# Patient Record
Sex: FEMALE | URBAN - METROPOLITAN AREA
[De-identification: names, ages, dates, MRNs, and addresses within clinical notes are randomized per-mention and may not be internally consistent; named-entity substitution may affect disease eponyms.]

---

## 2024-06-25 ENCOUNTER — OFFICE VISIT (OUTPATIENT)
Age: 69
End: 2024-06-25
Payer: MEDICARE

## 2024-06-25 VITALS — HEIGHT: 71 IN | HEART RATE: 69 BPM | TEMPERATURE: 97.1 F | OXYGEN SATURATION: 99 %

## 2024-06-25 DIAGNOSIS — M25.552 LEFT HIP PAIN: ICD-10-CM

## 2024-06-25 DIAGNOSIS — M54.50 LUMBAR PAIN: ICD-10-CM

## 2024-06-25 DIAGNOSIS — M54.16 LUMBAR RADICULOPATHY: ICD-10-CM

## 2024-06-25 DIAGNOSIS — M25.552 BILATERAL HIP PAIN: ICD-10-CM

## 2024-06-25 DIAGNOSIS — M25.551 BILATERAL HIP PAIN: ICD-10-CM

## 2024-06-25 DIAGNOSIS — G89.29 CHRONIC PRIMARY MUSCULOSKELETAL PAIN: Primary | ICD-10-CM

## 2024-06-25 DIAGNOSIS — M79.18 MYOFASCIAL PAIN: ICD-10-CM

## 2024-06-25 DIAGNOSIS — M79.18 CHRONIC PRIMARY MUSCULOSKELETAL PAIN: Primary | ICD-10-CM

## 2024-06-25 PROCEDURE — 99204 OFFICE O/P NEW MOD 45 MIN: CPT | Performed by: PHYSICAL MEDICINE & REHABILITATION

## 2024-06-25 PROCEDURE — 1123F ACP DISCUSS/DSCN MKR DOCD: CPT | Performed by: PHYSICAL MEDICINE & REHABILITATION

## 2024-06-25 RX ORDER — GABAPENTIN 300 MG/1
300 CAPSULE ORAL 3 TIMES DAILY
COMMUNITY
Start: 2024-04-23

## 2024-06-25 RX ORDER — GABAPENTIN 100 MG/1
200 CAPSULE ORAL NIGHTLY
COMMUNITY
Start: 2024-06-15

## 2024-06-25 RX ORDER — MELOXICAM 7.5 MG/1
7.5 TABLET ORAL 2 TIMES DAILY
COMMUNITY
Start: 2024-06-05

## 2024-06-25 RX ORDER — ERGOCALCIFEROL 1.25 MG/1
50000 CAPSULE ORAL WEEKLY
COMMUNITY
Start: 2024-06-03

## 2024-06-25 ASSESSMENT — ENCOUNTER SYMPTOMS
WHEEZING: 0
BACK PAIN: 1
NAUSEA: 0
SHORTNESS OF BREATH: 0
TROUBLE SWALLOWING: 0
VOMITING: 0

## 2024-06-25 NOTE — PROGRESS NOTES
Broderick Will presents today for   Chief Complaint   Patient presents with    Lower Back Pain       Is someone accompanying this pt? no    Is the patient using any DME equipment during OV? no    Coordination of Care:  1. Have you been to the ER, urgent care clinic since your last visit? no  Hospitalized since your last visit? no    2. Have you seen or consulted any other health care providers outside of the [facility] since your last visit? Yes, ortho, pcp Include any pap smears or colon screening. no        
in her teens       FAMILY HISTORY    No family history on file.    VITALS   Pulse 69   Temp 97.1 °F (36.2 °C) (Oral)   Ht 1.791 m (5' 10.5\")   SpO2 99% Comment: ALEJANDRA    PAIN ASSESSMENT         6/25/2024     8:51 AM   AMB PAIN ASSESSMENT   Location of Pain Back   Severity of Pain 5   Quality of Pain Throbbing;Other (Comment)   Duration of Pain Persistent   Frequency of Pain Constant   Aggravating Factors Other (Comment)   Limiting Behavior Yes   Relieving Factors Other (Comment);Heat;Nsaids   Result of Injury No   Work-Related Injury No   Are there other pain locations you wish to document? No         RADIOGRAPHS/DATA  Lumbar MRI images taken on 2/28/24 were personally reviewed with patient:  Findings:  Conus medullaris tip: L1-2  Alignment: Normal.  Marrow: Normal  Osseous: No compression fractures.  Degenerative disc disease: Disc space narrowing mid lumbar spine  Miscellaneous findings: None    Level by level analysis:  T12-L1, Normal disc. No stenosis.   L1-2, mild disc bulge. No stenosis.  L2-3, mild disc bulge. Mild ligamentum flavum and facet hypertrophy. Mild bilateral neuro foraminal stenosis. Mild central spinal canal stenosis.  L3-4, moderate disc bulge. Mild ligamentum flavum and facet hypertrophy. Dorsal epidural lipomatosis. Moderate bilateral neuroforaminal stenosis. Right lateral recess stenosis. Mild to moderate central spinal canal stenosis.  L4-5, moderate disc bulge. Mild ligamentum flavum and facet hypertrophy. Dorsal epidural lipomatosis. Moderate right and mild left neuroforaminal stenosis. Right lateral recess stenosis. Mild to moderate central spinal canal stenosis.  L5-S1, moderate disc bulge. Moderate bilateral neuroforaminal stenosis. Lateral recess stenosis. Bilateral facet hypertrophy. No overall high-grade central spinal canal stenosis.    Impression:  L3-4 mild disc bulge. Moderate bilateral neuroforaminal stenosis. Mild central canal stenosis.   L4-5 moderate disc bulge. Moderate

## 2024-07-15 ENCOUNTER — HOSPITAL ENCOUNTER (OUTPATIENT)
Facility: HOSPITAL | Age: 69
Setting detail: RECURRING SERIES
Discharge: HOME | End: 2024-07-18
Payer: MEDICARE

## 2024-07-15 PROCEDURE — 97162 PT EVAL MOD COMPLEX 30 MIN: CPT

## 2024-07-15 NOTE — PROGRESS NOTES
PT DAILY TREATMENT NOTE/LUMBAR EVAL     Patient Name: Broderick Will    Date: 7/15/2024    : 1955  Insurance: Payor: [employer] / Plan: Short [employer] MEDICARE VALUE HMO / Product Type: *No Product type* /      Patient  verified Yes   Visit #   Current / Total 1 10   Time   In / Out 10:27 AM 11:08 AM   Pain   In / Out 3/10 3/10   Subjective Functional Status/Changes: \"I am here for my back and my left hip.\"      SUBJECTIVE  Chief Complaint: Patient presents to PT with complaints of low back pain and hip pain. Reports symptoms in bilateral LE's as well. Notes everything started as low back pain a few years ago with worsening insidiously last year in March and then progressed to also feeling left hip pain about a month ago.  Imaging:   Lumbar MRI from [facility]. Per report, L3-4 mild disc bulge. Moderate bilateral neuroforaminal stenosis. Mild central canal stenosis. L4-5 moderate disc bulge. Moderate right neuroforaminal stenosis Right lateral recess stenosis. Mild to moderate central canal stenosis. L5-S1 moderate disc bulge. Moderate bilateral neuroforaminal stenosis. Lateral recess stenosis. This may effect the descending S1 nerve roots. Overall, no high-grade central spinal canal stenosis.   X-rays: has received for hips but is unsure of results   Mechanism of Injury: insidious onset with gradual increase in intensity   Current Functional Deficits: sleeping at night (getting comfortable), transferring, standing, prolonged sitting, walking for longer periods, stair climbing, squatting, bending over   Previous Treatment/Compliance: tylenol, gabapentin   PMHx/Surgical Hx: right shoulder pain, osteoporosis, arthritis   Pt Goals: \"how to better understand body movements with stretches\"     Pain Location: Low back; left hip (posterior)    Pain Status: [x]Constant (varies in intensity) [x]worsening   Pain Description: [x]stabbing [x]dull [x]aching [x]throbbing [x]Burning [x]Other:

## 2024-07-15 NOTE — PROGRESS NOTES
[facility] PH[facility]ERAP[facility]  [address] [address] Phone: [phone] Fax   Plan of Care / Statement of Necessity for Physical Therapy Services     Patient Name: Broderick Will : 1955   Medical   Diagnosis: Other low back pain [M54.59]  Left hip pain [M25.552] Treatment Diagnosis: M25.552  LEFT HIP PAIN  and M54.59, G89.29  CHRONIC LOWER BACK PAIN      Onset Date: 2024 (worsening symptoms) Payor :  Payor: [employer] / Plan: [employer] VALUE [employer] / Product Type: *No Product type* /    Referral Source: Phill Lamar MD Start of Care (SOC): 7/15/2024   Prior Hospitalization: See medical history Provider #: [id number]   Prior Level of Function: Ambulatory, mostly sedentary, independent with Adls    Comorbidities: Osteoporosis, arthritis, right shoulder pain      Assessment / key information:  Patient is a pleasant 68 year old female presenting to PT with complaints of low back pain and left hip pain. Patient notes low back pain started years ago but worsened insidiously about a year ago and a month ago insidiously developed left hip pain. Self reported functional deficits include walking, standing, prolonged static standing, bending, lifting, squatting, doing yard work, and transferring. PT evaluation reveals decreased trunk AROM, tenderness of lumbar/left hip musculature, strength impairments, functional movement abnormalities, balance limitations, and flexibility deficits. Patient would benefit from skilled PT to address noted impairments in order to return to PLOF, maintain independence, and reduce risk of further debility.     Evaluation Complexity:  History:  MEDIUM  Complexity : 1-2 comorbidities / personal factors will impact the outcome/ POC ; Examination:  HIGH Complexity : 4+ Standardized tests and measures addressing body structure, function, activity limitation and / or participation in recreation  ;Presentation:

## 2024-08-05 ENCOUNTER — HOSPITAL ENCOUNTER (OUTPATIENT)
Facility: HOSPITAL | Age: 69
Setting detail: RECURRING SERIES
Discharge: HOME | End: 2024-08-08
Payer: MEDICARE

## 2024-08-05 PROCEDURE — 97112 NEUROMUSCULAR REEDUCATION: CPT

## 2024-08-05 PROCEDURE — 97110 THERAPEUTIC EXERCISES: CPT

## 2024-08-05 PROCEDURE — 97530 THERAPEUTIC ACTIVITIES: CPT

## 2024-08-05 NOTE — PROGRESS NOTES
at eval: excessive UE assist, poor mechanics   -Patient to improve walking tolerance to at least 20 minutes to begin return to daily walking program and manage community outings including to grocery stores.   Status at eval: <20 minutes, increased pain   -Patient will be able to manage all activities of daily living including dressing, bathing, and managing chores with no more than 3/10 pain at worse in order to optimize functional capacity.  Status at eval: limited and increased pain     Next PN/ RC due 8/15/2024  Auth due 24 visits (7/15/2024-9/06/2024)     PLAN  Yes Continue plan of care  [x]  Upgrade activities as tolerated  []  Discharge due to :  []  Other:    Rebecca Hernandez, PT    8/5/2024    9:59 AM    Future Appointments   Date Time Provider Department Center   8/5/2024 10:20 AM Rebecca Hernandez, PT [facility]   8/7/2024 10:20 AM Kit Hancock PTA [facility]   8/12/2024  9:40 AM [facility] PT TERI Anderson [facility]   8/13/2024  9:00 AM Kit Hancock PTA [facility]   8/19/2024  9:40 AM Rebecca Hernandez, PT [facility]   8/20/2024  9:30 AM Phill Lamar MD [facility]   8/20/2024 11:40 AM Tom Carranza, PT [facility]

## 2024-08-07 ENCOUNTER — HOSPITAL ENCOUNTER (OUTPATIENT)
Facility: HOSPITAL | Age: 69
Setting detail: RECURRING SERIES
Discharge: HOME | End: 2024-08-10
Payer: MEDICARE

## 2024-08-07 PROCEDURE — 97530 THERAPEUTIC ACTIVITIES: CPT

## 2024-08-07 PROCEDURE — G0283 ELEC STIM OTHER THAN WOUND: HCPCS

## 2024-08-07 PROCEDURE — 97110 THERAPEUTIC EXERCISES: CPT

## 2024-08-07 PROCEDURE — 97112 NEUROMUSCULAR REEDUCATION: CPT

## 2024-08-07 NOTE — PROGRESS NOTES
PHYSICAL / OCCUPATIONAL THERAPY - DAILY TREATMENT NOTE    Patient Name: Broderick Will    Date: 2024    : 1955  Insurance: Payor: [employer] / [facility] / Product Type: *No Product type* /      Patient  verified Yes     Visit #   Current / Total 3 10   Time   In / Out 10:22 am 11:12 am   Pain   In / Out 4/10 2/10   Subjective Functional Status/Changes: \"It's the same all the time. I don't want surgery though.\"     TREATMENT AREA =  Other low back pain [M54.59]  Left hip pain [M25.552]     OBJECTIVE  Modalities Rationale:     decrease pain to improve patient's ability to progress to PLOF and address remaining functional goals.    10 min [x] Estim Unattended, type/location: IFC to L/S with patient semi-reclined with legs elevated on wedge                                     []  w/ice    [x]  w/heat    min [] Estim Attended, type/location:                                     []  w/US     []  w/ice    []  w/heat    []  TENS insruct      min []  Mechanical Traction: type/lbs                   []  pro   []  sup   []  int   []  cont    []  before manual    []  after manual    min []  Ultrasound, settings/location:      min  unbill []  Ice     []  Heat    location/position:     min []  Paraffin,  details:     min []  Vasopneumatic Device, press/temp:     min []  Whirlpool / Fluido:    If using vaso (only need to measure limb vaso being performed on)      pre-treatment girth :       post-treatment girth :       measured at (landmark location) :      min []  Other:    Skin assessment post-treatment:   Intact        Therapeutic Procedures:  Tx Min Billable or 1:1 Min (if diff from Tx Min) Procedure, Rationale, Specifics   18 16 [id number] Therapeutic Exercise (timed):  increase ROM, strength, coordination, balance, and proprioception to improve patient's ability to progress to PLOF and address remaining functional goals. (see flow sheet as applicable)     Details if applicable:       10 10

## 2024-08-12 ENCOUNTER — HOSPITAL ENCOUNTER (OUTPATIENT)
Facility: HOSPITAL | Age: 69
Setting detail: RECURRING SERIES
Discharge: HOME | End: 2024-08-15
Payer: MEDICARE

## 2024-08-12 PROCEDURE — 97110 THERAPEUTIC EXERCISES: CPT

## 2024-08-12 PROCEDURE — 97530 THERAPEUTIC ACTIVITIES: CPT

## 2024-08-12 PROCEDURE — 97112 NEUROMUSCULAR REEDUCATION: CPT

## 2024-08-12 NOTE — PROGRESS NOTES
PHYSICAL / OCCUPATIONAL THERAPY - DAILY TREATMENT NOTE    Patient Name: Broderick Will    Date: 2024    : 1955  [facility] / Plan: [facility] MEDICARE VALUE HMO / Product Type: *No Product type* /      Patient  verified Yes     Visit #   Current / Total 4 10   Time   In / Out 942 1032   Pain   In / Out 5 5   Subjective Functional Status/Changes: \"It's the same all the time. I don't want surgery though.\"     TREATMENT AREA =  Other low back pain [M54.59]  Left hip pain [M25.552]     OBJECTIVE  Modalities Rationale:     decrease pain to improve patient's ability to progress to PLOF and address remaining functional goals.    10 min [x] Estim Unattended, type/location: PREMOD  to L/S with patient semi-reclined with legs elevated on wedge                                     []  w/ice    []  w/heat    min [] Estim Attended, type/location:                                     []  w/US     []  w/ice    []  w/heat    []  TENS insruct      min []  Mechanical Traction: type/lbs                   []  pro   []  sup   []  int   []  cont    []  before manual    []  after manual    min []  Ultrasound, settings/location:      min  unbill []  Ice     []  Heat    location/position:     min []  Paraffin,  details:     min []  Vasopneumatic Device, press/temp:     min []  Whirlpool / Fluido:    If using vaso (only need to measure limb vaso being performed on)      pre-treatment girth :       post-treatment girth :       measured at (landmark location) :      min []  Other:    Skin assessment post-treatment:   Intact        Therapeutic Procedures:  Tx Min Billable or 1:1 Min (if diff from Tx Min) Procedure, Rationale, Specifics   15  [id number] Therapeutic Exercise (timed):  increase ROM, strength, coordination, balance, and proprioception to improve patient's ability to progress to PLOF and address remaining functional goals. (see flow sheet as applicable)     Details if applicable:    Added 3 way hip   12

## 2024-08-13 ENCOUNTER — HOSPITAL ENCOUNTER (OUTPATIENT)
Facility: HOSPITAL | Age: 69
Setting detail: RECURRING SERIES
Discharge: HOME | End: 2024-08-16
Payer: MEDICARE

## 2024-08-13 PROCEDURE — G0283 ELEC STIM OTHER THAN WOUND: HCPCS

## 2024-08-13 PROCEDURE — 97530 THERAPEUTIC ACTIVITIES: CPT

## 2024-08-13 PROCEDURE — 97112 NEUROMUSCULAR REEDUCATION: CPT

## 2024-08-13 PROCEDURE — 97110 THERAPEUTIC EXERCISES: CPT

## 2024-08-13 NOTE — PROGRESS NOTES
with left sided pain     SB left 20-30 FT to one inch inferior to PFJ        Neuro Screen [x] Light touch intact      Dural Mobility:  SLR Sitting: [x] -      Palpation:TTP left gluteals      Strength: measured in seated position     L(0-5) R (0-5)   Hip Flexion (L1,2) 4+ 4+   Knee Extension (L3,4) 5 5   Ankle Dorsiflexion (L4) 5 5   Hip IR 4 4   Hip ER 4+ 4+   Knee Flexion (S1,2) 4+ 4+      Functional Strength:  Bridge in Hooklyin% AROM  PPT/TrA: improved sequencing     Flexibility Deficits: hamstring, quadratus lumborum, hip external rotators                                         Global Muscular Weakness:  Abdominals: +  Quadratus Lumborum: +  Paraspinals: +     Balance:  SLS: (R) 29 sec, (L) 15 sec     Functional Movement Patterns  5 Times Sit to Stand: 7 seconds, no UE assist from standard chair       Progress toward goals / Updated goals:    Short Term Goals: To be accomplished in  4 weeks:  -Pt will be independent and compliant with HEP  Status at eval: HEP est at initial eval and will be progressed as needed.   Current: goal met  -Patient will be able to perform sit to stand without UE assist and proper eccentric control to improve ability to get up from toilet and chairs in home/community.  Status at eval: excessive UE assist, poor mechanics   Current: goal met from standard chair height     -Patient to improve walking tolerance to at least 20 minutes to begin return to daily walking program and manage community outings including to grocery stores.   Status at eval: <20 minutes, increased pain   Current: goal met; up to 4-5 hours until increase in left LE pain     -Patient will be able to manage all activities of daily living including dressing, bathing, and managing chores with no more than 3/10 pain at worse in order to optimize functional capacity.  Status at eval: limited and increased pain   Current: goal met; pt managing 1-2/10 on average via activity modification, HEP, and decreased medication 
mechanics   Current: goal met from standard chair height    -Patient to improve walking tolerance to at least 20 minutes to begin return to daily walking program and manage community outings including to grocery stores.   Status at eval: <20 minutes, increased pain   Current: goal met; up to 4-5 hours until increase in left LE pain    -Patient will be able to manage all activities of daily living including dressing, bathing, and managing chores with no more than 3/10 pain at worse in order to optimize functional capacity.  Status at eval: limited and increased pain   Current: goal met; pt managing 1-2/10 on average via activity modification, HEP, and decreased medication use         Long Term Goals: To be accomplished in  8-12  weeks from initial evaluation    -Patient will increase Oswestry score to 29% for indications of improved self perception of condition and to indicate clinically significant change in symptoms.   Status at IE: 42%   -Patient to improve standing tolerance to at least 30 minutes to manage waiting in lines in grocery stores, washing dishes, and managing work tasks with no more than 3/10 pain.  Status at IE: limited tolerance and increased pain   -Patient will improve 5 times sit to stand test to no more than 12 seconds, without UE assist, to indicate improved functional capacity with transfers and decreased fall risk.  Status at IE 20 seconds   -Patient will be IND with DC HEP for progression to self management    Status at IE: progressive HEP throughout treatment       Next PN/RC due 8/15/24  Authorization due (visit number/date) 24 visits / 9-6-24    PLAN  - Continue Plan of Care  - Other : see reassessment ; cont 1-2x4      If an interpreting service was utilized for treatment of this patient, the contents of this document represent the material reviewed with the patient via the .    Kit Hancock, PTA    8/13/2024    7:16 AM    Future Appointments   Date Time Provider

## 2024-08-15 NOTE — PROGRESS NOTES
[facility]  [facility], [address]  [place] [address]  Phone: [phone]  Fax: [phone]      Broderick Will  : 1955  PCP: Chele Wu MD  2024    PROGRESS NOTE    HISTORY OF PRESENT ILLNESS    24  c/o back pain radiating into buttock (L>R) ongoing a few years exacerbated more recently.   Pt initially had problems in feet feeling funny. Pt denies weakness in feet.   Pt previously had EMG with normal readings.   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   Pt notes previous hx of bursitis in right hip.   Pt takes Mobic 7.5mg BID PRN. Pt notes occasionally waking up with pain.   Pt takes Gabapentin with dizziness and somnolence.  Pt presents with Lumbar MRI from [facility]. Per report, L3-4 mild disc bulge. Moderate bilateral neuroforaminal stenosis. Mild central canal stenosis. L4-5 moderate disc bulge. Moderate right neuroforaminal stenosis Right lateral recess stenosis. Mild to moderate central canal stenosis. L5-S1 moderate disc bulge. Moderate bilateral neuroforaminal stenosis. Lateral recess stenosis. This may effect the descending S1 nerve roots. Overall, no high-grade central spinal canal stenosis.  PLAN: 3-4V BL Hip/Pelvis XR; Referral to PT      Broderick Will is a 69 y.o. female was seen today for follow up. Pt attended PT (7/15/24-ongoing) with benefit.     B/L Hips 3V XR dated 24 was reviewed. Per report, Mild bilateral hip osteoarthritis.     Pain Score:   2/10     reviewed.    REVIEW OF SYSTEMS  Review of Systems   Constitutional:  Negative for fever and unexpected weight change.   HENT:  Negative for trouble swallowing.    Eyes:  Negative for visual disturbance.   Respiratory:  Negative for shortness of breath and wheezing.    Gastrointestinal:  Negative for nausea and vomiting.   Genitourinary:  Negative for enuresis.   Musculoskeletal:  Positive for

## 2024-08-19 ENCOUNTER — HOSPITAL ENCOUNTER (OUTPATIENT)
Facility: HOSPITAL | Age: 69
Setting detail: RECURRING SERIES
Discharge: HOME | End: 2024-08-22
Payer: MEDICARE

## 2024-08-19 PROCEDURE — 97112 NEUROMUSCULAR REEDUCATION: CPT

## 2024-08-19 PROCEDURE — G0283 ELEC STIM OTHER THAN WOUND: HCPCS

## 2024-08-19 PROCEDURE — 97530 THERAPEUTIC ACTIVITIES: CPT

## 2024-08-19 PROCEDURE — 97110 THERAPEUTIC EXERCISES: CPT

## 2024-08-19 NOTE — PROGRESS NOTES
PHYSICAL / OCCUPATIONAL THERAPY - DAILY TREATMENT NOTE    Patient Name: Broderick Will    Date: 2024    : 1955  Insurance: Payor: [employer] / Plan: [employer] MEDICARE VALUE HMO / Product Type: *No Product type* /      Patient  verified Yes     Visit #   Current / Total 1 10   Time   In / Out 9:40 AM 10:36 AM   Pain   In / Out 1/10  0/10   Subjective Functional Status/Changes: \"I am doing pretty good today.\"      TREATMENT AREA =  Other low back pain [M54.59]  Left hip pain [M25.552]     OBJECTIVE  Modalities Rationale:     decrease pain to improve patient's ability to progress to PLOF and address remaining functional goals.    10 min [x] Estim Unattended, type/location: IFC  to L/S in supported hooklying                                     []  w/ice    []  w/heat   Skin assessment post-treatment:   Intact        Therapeutic Procedures:  Tx Min  46 Billable or 1:1 Min (if diff from Tx Min)  41 Procedure, Rationale, Specifics   17 12 73400 Therapeutic Exercise (timed):  increase ROM, strength, coordination, balance, and proprioception to improve patient's ability to progress to PLOF and address remaining functional goals. (see flow sheet as applicable)     Details if applicable: stretches, strengthening, nu step      13 13 32645 Neuromuscular Re-Education (timed):  improve balance, coordination, kinesthetic sense, posture, core stability and proprioception to improve patient's ability to develop conscious control of individual muscles and awareness of position of extremities in order to progress to PLOF and address remaining functional goals. (see flow sheet as applicable)     Details if applicable: glute/core re ed, balance     16 16 05624 Therapeutic Activity (timed):  use of dynamic activities replicating functional movements to increase ROM, strength, coordination, balance, and proprioception in order to improve patient's ability to progress to PLOF and address remaining functional goals.  (see

## 2024-08-20 ENCOUNTER — HOSPITAL ENCOUNTER (OUTPATIENT)
Facility: HOSPITAL | Age: 69
Setting detail: RECURRING SERIES
Discharge: HOME | End: 2024-08-23
Payer: MEDICARE

## 2024-08-20 ENCOUNTER — OFFICE VISIT (OUTPATIENT)
Age: 69
End: 2024-08-20
Payer: MEDICARE

## 2024-08-20 VITALS — BODY MASS INDEX: 28.76 KG/M2 | WEIGHT: 205.4 LBS | HEIGHT: 71 IN

## 2024-08-20 DIAGNOSIS — M79.18 CHRONIC PRIMARY MUSCULOSKELETAL PAIN: Primary | ICD-10-CM

## 2024-08-20 DIAGNOSIS — M79.18 MYOFASCIAL PAIN: ICD-10-CM

## 2024-08-20 DIAGNOSIS — M54.50 LUMBAR PAIN: ICD-10-CM

## 2024-08-20 DIAGNOSIS — G89.29 CHRONIC PRIMARY MUSCULOSKELETAL PAIN: Primary | ICD-10-CM

## 2024-08-20 DIAGNOSIS — M25.552 BILATERAL HIP PAIN: ICD-10-CM

## 2024-08-20 DIAGNOSIS — M25.551 BILATERAL HIP PAIN: ICD-10-CM

## 2024-08-20 DIAGNOSIS — M54.16 LUMBAR RADICULOPATHY: ICD-10-CM

## 2024-08-20 PROBLEM — E55.9 VITAMIN D DEFICIENCY: Status: ACTIVE | Noted: 2017-11-28

## 2024-08-20 PROBLEM — M25.562 ACUTE PAIN OF LEFT KNEE: Status: ACTIVE | Noted: 2017-11-28

## 2024-08-20 PROCEDURE — 97110 THERAPEUTIC EXERCISES: CPT

## 2024-08-20 PROCEDURE — 97112 NEUROMUSCULAR REEDUCATION: CPT

## 2024-08-20 PROCEDURE — G0283 ELEC STIM OTHER THAN WOUND: HCPCS

## 2024-08-20 PROCEDURE — 99213 OFFICE O/P EST LOW 20 MIN: CPT | Performed by: PHYSICAL MEDICINE & REHABILITATION

## 2024-08-20 PROCEDURE — 1123F ACP DISCUSS/DSCN MKR DOCD: CPT | Performed by: PHYSICAL MEDICINE & REHABILITATION

## 2024-08-20 RX ORDER — MELOXICAM 15 MG/1
TABLET ORAL
COMMUNITY
Start: 2024-07-18

## 2024-08-20 ASSESSMENT — ENCOUNTER SYMPTOMS
BACK PAIN: 1
TROUBLE SWALLOWING: 0
WHEEZING: 0
SHORTNESS OF BREATH: 0
VOMITING: 0
NAUSEA: 0

## 2024-08-20 NOTE — PROGRESS NOTES
PHYSICAL / OCCUPATIONAL THERAPY - DAILY TREATMENT NOTE (updated )    Patient Name: Broderick Will    Date: 2024    : 1955  Insurance: Payor: [employer] / Plan: [employer] MEDICARE VALUE HMO / Product Type: *No Product type* /      Patient  verified yes     Visit #   Current / Total 2 10 Total Time   Time   In / Out 11:40 12:35 55   Pain   In / Out 1 0    Subjective Functional Status/Changes: I just saw Dr. Lamar, he found arthritis in my hips on the x ray. I feel better knowing what is going on and causing my pain.       TREATMENT AREA =  Other low back pain [M54.59]  Left hip pain [M25.552]    OBJECTIVE    Modalities Rationale:     decrease pain to improve patient's ability to progress to PLOF and address remaining functional goals.             --  10 min [x] Estim Unattended,   Type:  Location:  Position:   IFC  Supine w/ wedge  Lumbar spine                                     []  w/ice    [x]  w/heat   Skin assessment post-treatment (if applicable):    [x]  intact    []  redness- no adverse reaction                 []redness - adverse reaction:           Therapeutic Procedures:  45  Total 41  Total [facility] Totals Reminder: bill using total billable min of TIMED therapeutic procedures (example: do not include dry needle or estim unattended, both untimed codes, in totals to left)  8-22 min = 1 unit; 23-37 min = 2 units; 38-52 min = 3 units; 53-67 min = 4 units; 68-82 min = 5 units   Tx Min Billable or 1:1 Min (if diff from Tx Min) Procedure, Rationale, Specifics   31 27 67310 Therapeutic Exercise (timed):  increase ROM, strength, coordination, balance, and proprioception to improve patient's ability to progress to PLOF and address remaining functional goals. (see flow sheet as applicable)   Details if applicable:       14 14 28798 Neuromuscular Re-Education (timed):  improve balance, coordination, kinesthetic sense, posture, core stability and proprioception to improve patient's ability to

## 2024-08-26 ENCOUNTER — HOSPITAL ENCOUNTER (OUTPATIENT)
Facility: HOSPITAL | Age: 69
Setting detail: RECURRING SERIES
Discharge: HOME | End: 2024-08-29
Payer: MEDICARE

## 2024-08-26 PROCEDURE — 97110 THERAPEUTIC EXERCISES: CPT

## 2024-08-26 PROCEDURE — G0283 ELEC STIM OTHER THAN WOUND: HCPCS

## 2024-08-26 PROCEDURE — 97112 NEUROMUSCULAR REEDUCATION: CPT

## 2024-08-26 NOTE — PROGRESS NOTES
of extremities in order to progress to PLOF and address remaining functional goals. (see flow sheet as applicable)     Details if applicable:  Core re ed             [x]  Patient Education billed concurrently with other procedures   [x] Review HEP    [] Progressed/Changed HEP, detail:    [x] Other detail:   managing arthritis with regular movement and \"wellness\"    Objective Information/Functional Measures/Assessment    Focus treatment on freq reminders for TA engagement   Challenged by GTB with 3 way hip - sharon on the L   Bridge - 50% sec to lack of core/ hip strength and spinal mobility   5x STS test : 24 sec   MM fatigue   Cont ES for pain management to increase function      Patient will continue to benefit from skilled PT / OT services to modify and progress therapeutic interventions, analyze and address functional mobility deficits, analyze and address ROM deficits, analyze and address strength deficits, analyze and address soft tissue restrictions, analyze and cue for proper movement patterns, analyze and modify for postural abnormalities, analyze and address imbalance/dizziness, and instruct in home and community integration to address functional deficits and attain remaining goals.    Progress toward goals / Updated goals:  []  See Progress Note/Recertification    -Patient will increase Oswestry score to 29% for indications of improved self perception of condition and to indicate clinically significant change in symptoms.   Status at IE: 42%   Current:     -Patient to improve standing tolerance to at least 30 minutes to manage waiting in lines in grocery stores, washing dishes, and managing work tasks with no more than 3/10 pain.  Status at IE: limited tolerance and increased pain   Current: goal progressing, depends on activity before attempting but suspects less than 30 minutes (8/19/2024)     -Patient will improve 5 times sit to stand test to no more than 12 seconds, without UE assist, to indicate  improved functional capacity with transfers and decreased fall risk.  Status at IE 20 seconds   Current: not met at 24 sec however performed at the end of treatment/ fatigued  8/26/2024    -Patient will be IND with DC HEP for progression to self management    Status at IE: progressive HEP throughout treatment   Current: updated today (8/20/24)     Next PN/RC due 9/12/2024  Authorization due (visit number/date) 24 visits / 9-6-24    PLAN  [x] Continue plan of care  [x]  Upgrade activities as tolerated  []  Discharge due to :  []  Other:    Henry Krishnamurthy, PT    8/26/2024        If an interpreting service was utilized for treatment of this patient, the contents of this document represent the material reviewed with the patient via the .     Future Appointments   Date Time Provider Department Center   8/26/2024  7:00 AM Henry Krishnamurthy PT [facility]   8/27/2024  7:40 AM [facility] PT TERI 3 [facility]   9/3/2024  9:00 AM Kit Hancock PTA [facility]   10/15/2024  9:30 AM Phill Lamar MD [facility]

## 2024-08-27 ENCOUNTER — HOSPITAL ENCOUNTER (OUTPATIENT)
Facility: HOSPITAL | Age: 69
Setting detail: RECURRING SERIES
Discharge: HOME | End: 2024-08-30
Payer: MEDICARE

## 2024-08-27 PROCEDURE — 97140 MANUAL THERAPY 1/> REGIONS: CPT

## 2024-08-27 PROCEDURE — 97112 NEUROMUSCULAR REEDUCATION: CPT

## 2024-08-27 PROCEDURE — G0283 ELEC STIM OTHER THAN WOUND: HCPCS

## 2024-08-27 PROCEDURE — 97535 SELF CARE MNGMENT TRAINING: CPT

## 2024-08-27 NOTE — PROGRESS NOTES
PHYSICAL / OCCUPATIONAL THERAPY - DAILY TREATMENT NOTE (updated )    Patient Name: Broderick Will    Date: 2024    : 1955  Insurance: Payor: [employer] / [facility] / Product Type: *No Product type* /      Patient  verified Yes     Visit #   Current / Total 4 10   Time   In / Out 7:46 8:40   Pain   In / Out 2-3 0   Subjective Functional Status/Changes: Pt reports left sided LBP  today.      TREATMENT AREA =  Other low back pain [M54.59]  Left hip pain [M25.552]    OBJECTIVE    Modalities Rationale:     decrease pain and increase tissue extensibility to improve patient's ability to progress to PLOF and address remaining functional goals.    10 min [x] Estim Unattended, type/location: Pre mod to left glutes and l/s; in supine with legs on wedge.                                      []  w/ice    [x]  w/heat   Skin assessment post-treatment (if applicable):    [x]  intact    []  redness- no adverse reaction                 []redness - adverse reaction:         Therapeutic Procedures:  Tx Min Billable or 1:1 Min (if diff from Tx Min) Procedure, Rationale, Specifics   12  21629 Self Care/Home Management (timed):  improve patient knowledge and understanding of pain reducing techniques, positioning, posture/ergonomics, home safety, activity modification, diagnosis/prognosis, and physical therapy expectations, procedures and progression  to improve patient's ability to progress to PLOF and address remaining functional goals.  (see flow sheet as applicable)     Details if applicable:  Pt education on relevant anatomy/physiology.       20  04978 Neuromuscular Re-Education (timed):  improve balance, coordination, kinesthetic sense, posture, core stability and proprioception to improve patient's ability to develop conscious control of individual muscles and awareness of position of extremities in order to progress to PLOF and address remaining functional goals. (see flow sheet as

## 2024-09-03 ENCOUNTER — HOSPITAL ENCOUNTER (OUTPATIENT)
Facility: HOSPITAL | Age: 69
Setting detail: RECURRING SERIES
Discharge: HOME | End: 2024-09-06
Payer: MEDICARE

## 2024-09-03 PROCEDURE — 97112 NEUROMUSCULAR REEDUCATION: CPT

## 2024-09-03 PROCEDURE — 97140 MANUAL THERAPY 1/> REGIONS: CPT

## 2024-09-03 PROCEDURE — 97110 THERAPEUTIC EXERCISES: CPT

## 2024-09-03 PROCEDURE — G0283 ELEC STIM OTHER THAN WOUND: HCPCS

## 2024-09-03 NOTE — PROGRESS NOTES
PHYSICAL / OCCUPATIONAL THERAPY - DAILY TREATMENT NOTE (updated )    Patient Name: Broderick Will    Date: 9/3/2024    : 1955  Insurance: Payor: [employer] / Plan: [employer] MEDICARE VALUE HMO / Product Type: *No Product type* /      Patient  verified Yes     Visit #   Current / Total 5 10   Time   In / Out 9:00 am 9:56 am   Pain   In / Out 2/10 0/10   Subjective Functional Status/Changes: Patient reports walking at the park more frequently, stating she has pain in the back of her left hip when      TREATMENT AREA =  Other low back pain [M54.59]  Left hip pain [M25.552]    OBJECTIVE    Modalities Rationale:     decrease pain and increase tissue extensibility to improve patient's ability to progress to PLOF and address remaining functional goals.    10 min [x] Estim Unattended, type/location: Pre mod to left glutes and l/s; in supine with legs on wedge.                                      []  w/ice    [x]  w/heat   Skin assessment post-treatment (if applicable):    [x]  intact    []  redness- no adverse reaction                 []redness - adverse reaction:         Therapeutic Procedures:  Tx Min Billable or 1:1 Min (if diff from Tx Min) Procedure, Rationale, Specifics   16 8 16423 Therapeutic Exercise (timed):  increase ROM, strength, coordination, balance, and proprioception to improve patient's ability to progress to PLOF and address remaining functional goals. (see flow sheet as applicable)        18 18 14628 Neuromuscular Re-Education (timed):  improve balance, coordination, kinesthetic sense, posture, core stability and proprioception to improve patient's ability to develop conscious control of individual muscles and awareness of position of extremities in order to progress to PLOF and address remaining functional goals. (see flow sheet as applicable)     Details if applicable:       12 12 56064 Manual Therapy (timed):  decrease pain, increase ROM, increase tissue extensibility, and increase

## 2024-09-23 ENCOUNTER — TELEPHONE (OUTPATIENT)
Age: 69
End: 2024-09-23

## 2024-09-23 ENCOUNTER — HOSPITAL ENCOUNTER (OUTPATIENT)
Facility: HOSPITAL | Age: 69
Setting detail: RECURRING SERIES
Discharge: HOME | End: 2024-09-26
Payer: MEDICARE

## 2024-09-23 PROCEDURE — 97530 THERAPEUTIC ACTIVITIES: CPT

## 2024-09-23 PROCEDURE — 97110 THERAPEUTIC EXERCISES: CPT

## 2024-09-23 PROCEDURE — 97112 NEUROMUSCULAR REEDUCATION: CPT

## 2024-09-24 ENCOUNTER — APPOINTMENT (OUTPATIENT)
Facility: HOSPITAL | Age: 69
End: 2024-09-24
Payer: MEDICARE

## 2024-09-24 ENCOUNTER — TELEPHONE (OUTPATIENT)
Age: 69
End: 2024-09-24

## 2024-09-24 NOTE — TELEPHONE ENCOUNTER
Patient states she has questions regarding her bilateral hip x-ray results after receiving message from nurse. Patient was offered to schedule a virtual appt on 9/26/24 at 12:45 pm, and declined. Patient is asking for a call back, and can be reached at [phone].

## 2024-09-24 NOTE — TELEPHONE ENCOUNTER
Called patient [phone] and verified her name and date of birth. I inquired what her concerns were. She stated that she had not been told about the results of the xray. I informed her what the impression was. MPRESSION:  Mild bilateral hip osteoarthritis. She stated what can be done with that. I informed her that is why he is has her in physical therapy to try and work that out. Patient verbalized understanding and no further action needed at this time.

## 2024-09-26 ENCOUNTER — HOSPITAL ENCOUNTER (OUTPATIENT)
Facility: HOSPITAL | Age: 69
Setting detail: RECURRING SERIES
Discharge: HOME | End: 2024-09-29
Payer: MEDICARE

## 2024-09-26 PROCEDURE — 97530 THERAPEUTIC ACTIVITIES: CPT

## 2024-09-26 PROCEDURE — 97112 NEUROMUSCULAR REEDUCATION: CPT

## 2024-09-26 PROCEDURE — 97110 THERAPEUTIC EXERCISES: CPT

## 2024-09-26 PROCEDURE — G0283 ELEC STIM OTHER THAN WOUND: HCPCS

## 2024-09-26 NOTE — PROGRESS NOTES
patient's ability to progress to PLOF and address remaining functional goals.  (see flow sheet as applicable)     Details if applicable: squatting, bridging for bed mobility            Details if applicable:            Details if applicable:     45 38 [facility] Totals Reminder: bill using total billable min of TIMED therapeutic procedures (example: do not include dry needle or estim unattended, both untimed codes, in totals to left)  8-22 min = 1 unit; 23-37 min = 2 units; 38-52 min = 3 units; 53-67 min = 4 units; 68-82 min = 5 units   Total Total     [x]  Patient Education billed concurrently with other procedures   [x] Review HEP    [] Progressed/Changed HEP, detail:    [] Other detail:         Objective Information/Functional Measures/Assessment  Patient demonstrates improved squat technique, primarily requiring cueing for hip extension when standing. Patient continues to benefit from gentle strengthening exercise to reduce c/o left hip pain, therefore, discussed prolonged walking at alternative option if unable/unwilling to exercise via HEP regularly.    Core strength: 50% AROM        Patient will continue to benefit from skilled PT / OT services to modify and progress therapeutic interventions, analyze and address functional mobility deficits, analyze and address ROM deficits, analyze and address strength deficits, analyze and address soft tissue restrictions, analyze and cue for proper movement patterns, analyze and modify for postural abnormalities, analyze and address imbalance/dizziness, and instruct in home and community integration to address functional deficits and attain remaining goals.      Progress toward goals / Updated goals:  []  See Progress Note/Recertification  Patient will be able to perform at least 10 sit to stands in 30 seconds (without UE assist) and proper eccentric control to indicate increased functional strength and progress with squat training.  Status at last assessment: 28 seconds; 5

## 2024-09-30 ENCOUNTER — APPOINTMENT (OUTPATIENT)
Facility: HOSPITAL | Age: 69
End: 2024-09-30
Payer: MEDICARE

## 2024-09-30 ENCOUNTER — HOSPITAL ENCOUNTER (OUTPATIENT)
Facility: HOSPITAL | Age: 69
Setting detail: RECURRING SERIES
Discharge: HOME | End: 2024-10-03
Payer: MEDICARE

## 2024-09-30 PROCEDURE — 97112 NEUROMUSCULAR REEDUCATION: CPT

## 2024-09-30 PROCEDURE — 97530 THERAPEUTIC ACTIVITIES: CPT

## 2024-09-30 PROCEDURE — 97110 THERAPEUTIC EXERCISES: CPT

## 2024-09-30 NOTE — PROGRESS NOTES
PHYSICAL / OCCUPATIONAL THERAPY - DAILY TREATMENT NOTE    Patient Name: Broderick Will    Date: 2024    : 1955  Insurance: Payor: [employer] / Plan: [employer] MEDICARE VALUE HMO / Product Type: *No Product type* /      Patient  verified Yes     Visit #   Current / Total 2 10   Time   In / Out 8:21 AM 9:04   Pain   In / Out \"Stiff\"  0/10   Subjective Functional Status/Changes: \"I went walking for about 30 minutes and it went alright.\"      TREATMENT AREA =  Other low back pain [M54.59]  Left hip pain [M25.552]     OBJECTIVE    Therapeutic Procedures:  Tx Min  43 Billable or 1:1 Min (if diff from Tx Min)  43 Procedure, Rationale, Specifics   9 9 04168 Therapeutic Exercise (timed):  increase ROM, strength, coordination, balance, and proprioception to improve patient's ability to progress to PLOF and address remaining functional goals. (see flow sheet as applicable)     Details if applicable:  strengthening     14 14 16589 Neuromuscular Re-Education (timed):  improve balance, coordination, kinesthetic sense, posture, core stability and proprioception to improve patient's ability to develop conscious control of individual muscles and awareness of position of extremities in order to progress to PLOF and address remaining functional goals. (see flow sheet as applicable)     Details if applicable:  quadriceps re-education, core re-education     20 20 68374 Therapeutic Activity (timed):  use of dynamic activities replicating functional movements to increase ROM, strength, coordination, balance, and proprioception in order to improve patient's ability to progress to PLOF and address remaining functional goals.  (see flow sheet as applicable)     Details if applicable: functional movements, patient education (walking program progressions every other day for 1 week), resisted side stepping; gardening activities (floor<>stand, tall kneel to lunge (knee on pad), kneel on heels to tall kneel with 7.5 KB, half

## 2024-10-01 ENCOUNTER — APPOINTMENT (OUTPATIENT)
Facility: HOSPITAL | Age: 69
End: 2024-10-01
Payer: MEDICARE

## 2024-10-03 ENCOUNTER — HOSPITAL ENCOUNTER (OUTPATIENT)
Facility: HOSPITAL | Age: 69
Setting detail: RECURRING SERIES
Discharge: HOME | End: 2024-10-06
Payer: MEDICARE

## 2024-10-03 PROCEDURE — 97530 THERAPEUTIC ACTIVITIES: CPT

## 2024-10-03 PROCEDURE — G0283 ELEC STIM OTHER THAN WOUND: HCPCS

## 2024-10-03 PROCEDURE — 97112 NEUROMUSCULAR REEDUCATION: CPT

## 2024-10-03 PROCEDURE — 97110 THERAPEUTIC EXERCISES: CPT

## 2024-10-03 NOTE — PROGRESS NOTES
PHYSICAL / OCCUPATIONAL THERAPY - DAILY TREATMENT NOTE    Patient Name: Broderick Will    Date: 10/3/2024    : 1955  Insurance: Payor: [employer]ICARE / Plan: [employer] MEDICARE VALUE HMO / Product Type: *No Product type* /      Patient  verified Yes     Visit #   Current / Total 3 10   Time   In / Out 738 8:50   Pain   In / Out 5/10 2/10   Subjective Functional Status/Changes: \"I have not slept good, last night throbbing pain all over, they tell me I have arthritis  .\"      TREATMENT AREA =  Other low back pain [M54.59]  Left hip pain [M25.552]     OBJECTIVE    Estim Unattended (UNTIMED), with HEAT type/location: L lower lumbar      Min Rationale   10 decrease inflammation and decrease pain to improve patient's ability to progress to PLOF and address remaining functional goals.     Skin assessment post-treatment:   Intact    Therapeutic Procedures:  Tx Min   Billable or 1:1 Min (if diff from Tx Min)   Procedure, Rationale, Specifics    30 10213 Therapeutic Exercise (timed):  increase ROM, strength, coordination, balance, and proprioception to improve patient's ability to progress to PLOF and address remaining functional goals. (see flow sheet as applicable)     Details if applicable:  added supine and standing stretching to complete in the morning to decrease pain HEP provided patient able to demo with no increase in pain       22 45918 Neuromuscular Re-Education (timed):  improve balance, coordination, kinesthetic sense, posture, core stability and proprioception to improve patient's ability to develop conscious control of individual muscles and awareness of position of extremities in order to progress to PLOF and address remaining functional goals. (see flow sheet as applicable)     Details if applicable:   VC and TC for engaging core muscles during single leg activity       11 [id number] Therapeutic Activity (timed):  use of dynamic activities replicating functional movements to increase ROM, strength,

## 2024-10-07 ENCOUNTER — HOSPITAL ENCOUNTER (OUTPATIENT)
Facility: HOSPITAL | Age: 69
Setting detail: RECURRING SERIES
Discharge: HOME | End: 2024-10-10
Payer: MEDICARE

## 2024-10-07 PROCEDURE — 97112 NEUROMUSCULAR REEDUCATION: CPT

## 2024-10-07 PROCEDURE — 97530 THERAPEUTIC ACTIVITIES: CPT

## 2024-10-07 PROCEDURE — 97110 THERAPEUTIC EXERCISES: CPT

## 2024-10-07 NOTE — PROGRESS NOTES
PHYSICAL / OCCUPATIONAL THERAPY - DAILY TREATMENT NOTE    Patient Name: Broderick Will    Date: 10/7/2024    : 1955  Insurance: Payor: [employer] MEDICARE / Plan: [employer] MEDICARE VALUE HMO / Product Type: *No Product type* /      Patient  verified Yes     Visit #   Current / Total 4 10   Time   In / Out 10:23 AM  11:06 am   Pain   In / Out 1/10  1/10   Subjective Functional Status/Changes: \"I was feeling it in my hip after about 15 minutes of standing, I had to sit down. I did work in the garden.\"       TREATMENT AREA =  Other low back pain [M54.59]  Left hip pain [M25.552]     OBJECTIVE    Therapeutic Procedures:  Tx Min  43 Billable or 1:1 Min (if diff from Tx Min)  39 Procedure, Rationale, Specifics   15 11 16276 Therapeutic Exercise (timed):  increase ROM, strength, coordination, balance, and proprioception to improve patient's ability to progress to PLOF and address remaining functional goals. (see flow sheet as applicable)     Details if applicable:  stretches, strengthening, mobility      12 12 55041 Neuromuscular Re-Education (timed):  improve balance, coordination, kinesthetic sense, posture, core stability and proprioception to improve patient's ability to develop conscious control of individual muscles and awareness of position of extremities in order to progress to PLOF and address remaining functional goals. (see flow sheet as applicable)     Details if applicable:   glute/core re ed      16 16 41116 Therapeutic Activity (timed):  use of dynamic activities replicating functional movements to increase ROM, strength, coordination, balance, and proprioception in order to improve patient's ability to progress to PLOF and address remaining functional goals.  (see flow sheet as applicable)     Details if applicable:  functional movements      43 39 [facility] Totals Reminder: bill using total billable min of TIMED therapeutic procedures (example: do not include dry needle or estim unattended, both untimed

## 2024-10-09 ENCOUNTER — HOSPITAL ENCOUNTER (OUTPATIENT)
Facility: HOSPITAL | Age: 69
Setting detail: RECURRING SERIES
Discharge: HOME | End: 2024-10-12
Payer: MEDICARE

## 2024-10-09 PROCEDURE — 97110 THERAPEUTIC EXERCISES: CPT

## 2024-10-09 PROCEDURE — 97112 NEUROMUSCULAR REEDUCATION: CPT

## 2024-10-09 PROCEDURE — G0283 ELEC STIM OTHER THAN WOUND: HCPCS

## 2024-10-09 PROCEDURE — 97530 THERAPEUTIC ACTIVITIES: CPT

## 2024-10-09 NOTE — PROGRESS NOTES
and address remaining functional goals.  (see flow sheet as applicable)     Details if applicable:  functional movements, patient education components     45 45 [facility] Totals Reminder: bill using total billable min of TIMED therapeutic procedures (example: do not include dry needle or estim unattended, both untimed codes, in totals to left)  8-22 min = 1 unit; 23-37 min = 2 units; 38-52 min = 3 units; 53-67 min = 4 units; 68-82 min = 5 units   Total Total     [x]  Patient Education billed concurrently with other procedures   [] Review HEP    [x] Progressed/Changed HEP, detail:    [] Other detail:       Objective Information/Functional Measures/Assessment  Increased resistance on nu step to facilitate LE strength gains   Cuing for eccentric control and obtaining upright posture upon standing with sit to stands      Patient will continue to benefit from skilled PT / OT services to modify and progress therapeutic interventions, analyze and address functional mobility deficits, analyze and address ROM deficits, analyze and address strength deficits, analyze and address soft tissue restrictions, analyze and cue for proper movement patterns, analyze and modify for postural abnormalities, analyze and address imbalance/dizziness, and instruct in home and community integration to address functional deficits and attain remaining goals.    Progress toward goals / Updated goals:  []  See Progress Note/Recertification  Patient will be able to perform at least 10 sit to stands in 30 seconds (without UE assist) and proper eccentric control to indicate increased functional strength and progress with squat training.  Status at last assessment: 28 seconds; 5 repetitions  Patient will improve walking tolerance to at least 30 minutes in order to resume regular recreational walking program for maintenance of strength and cardiovascular endurance.  Status at last assessment: <30 minutes  Current: goal met, has reached 30 minutes without

## 2024-10-09 NOTE — PROGRESS NOTES
[facility]  [facility], [address]  [place] [address]  Phone: [phone]  Fax: [phone]      rBoderick Will  : 1955  PCP: Chele Wu MD  10/15/2024    PROGRESS NOTE    HISTORY OF PRESENT ILLNESS    24  c/o back pain radiating into buttock (L>R) ongoing a few years exacerbated more recently.   Pt initially had problems in feet feeling funny. Pt denies weakness in feet.   Pt previously had EMG with normal readings.   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   Pt notes previous hx of bursitis in right hip.   Pt takes Mobic 7.5mg BID PRN. Pt notes occasionally waking up with pain.   Pt takes Gabapentin with dizziness and somnolence.  Pt presents with Lumbar MRI from [facility]. Per report, L3-4 mild disc bulge. Moderate bilateral neuroforaminal stenosis. Mild central canal stenosis. L4-5 moderate disc bulge. Moderate right neuroforaminal stenosis Right lateral recess stenosis. Mild to moderate central canal stenosis. L5-S1 moderate disc bulge. Moderate bilateral neuroforaminal stenosis. Lateral recess stenosis. This may effect the descending S1 nerve roots. Overall, no high-grade central spinal canal stenosis.  PLAN: 3-4V BL Hip/Pelvis XR; Referral to PT    24  Pt attended PT (7/15/24-ongoing) with benefit.   B/L Hips 3V XR dated 24 was reviewed. Per report, Mild bilateral hip osteoarthritis.    Broderick Will is a 69 y.o. female was seen today for follow up. Pt continues with PT. Pt notes she recently followed up with PCP and was concerned as she wasn't sure if routine labs tested her kidneys. Most recent creatinine 0.8 and GFR >60 on 24. Pt continues with PT twice a week and regularly walks and does HEP the other days. Pt also notes of numbness/tingling in toes.    Pain Score:   4/10     reviewed.    REVIEW OF SYSTEMS  Review of Systems   Constitutional:  Negative for

## 2024-10-15 ENCOUNTER — HOSPITAL ENCOUNTER (OUTPATIENT)
Facility: HOSPITAL | Age: 69
Setting detail: RECURRING SERIES
Discharge: HOME | End: 2024-10-18
Payer: MEDICARE

## 2024-10-15 ENCOUNTER — OFFICE VISIT (OUTPATIENT)
Age: 69
End: 2024-10-15
Payer: MEDICARE

## 2024-10-15 VITALS
TEMPERATURE: 97.6 F | HEART RATE: 70 BPM | WEIGHT: 205.2 LBS | RESPIRATION RATE: 18 BRPM | BODY MASS INDEX: 28.73 KG/M2 | OXYGEN SATURATION: 98 % | HEIGHT: 71 IN

## 2024-10-15 DIAGNOSIS — G89.29 CHRONIC PRIMARY MUSCULOSKELETAL PAIN: Primary | ICD-10-CM

## 2024-10-15 DIAGNOSIS — M25.551 BILATERAL HIP PAIN: ICD-10-CM

## 2024-10-15 DIAGNOSIS — M79.18 CHRONIC PRIMARY MUSCULOSKELETAL PAIN: Primary | ICD-10-CM

## 2024-10-15 DIAGNOSIS — M54.50 LUMBAR PAIN: ICD-10-CM

## 2024-10-15 DIAGNOSIS — M25.552 BILATERAL HIP PAIN: ICD-10-CM

## 2024-10-15 DIAGNOSIS — M54.16 LUMBAR RADICULOPATHY: ICD-10-CM

## 2024-10-15 PROCEDURE — 97112 NEUROMUSCULAR REEDUCATION: CPT

## 2024-10-15 PROCEDURE — 97110 THERAPEUTIC EXERCISES: CPT

## 2024-10-15 PROCEDURE — 99213 OFFICE O/P EST LOW 20 MIN: CPT | Performed by: PHYSICAL MEDICINE & REHABILITATION

## 2024-10-15 PROCEDURE — 1123F ACP DISCUSS/DSCN MKR DOCD: CPT | Performed by: PHYSICAL MEDICINE & REHABILITATION

## 2024-10-15 PROCEDURE — 97530 THERAPEUTIC ACTIVITIES: CPT

## 2024-10-15 PROCEDURE — G0283 ELEC STIM OTHER THAN WOUND: HCPCS

## 2024-10-15 RX ORDER — ACETAMINOPHEN 500 MG
500 TABLET ORAL EVERY 6 HOURS PRN
COMMUNITY

## 2024-10-15 ASSESSMENT — ENCOUNTER SYMPTOMS
SHORTNESS OF BREATH: 0
TROUBLE SWALLOWING: 0
WHEEZING: 0
NAUSEA: 0
VOMITING: 0
BACK PAIN: 1

## 2024-10-15 NOTE — PROGRESS NOTES
PHYSICAL / OCCUPATIONAL THERAPY - DAILY TREATMENT NOTE    Patient Name: Broderick Will    Date: 10/15/2024    : 1955  Insurance: Payor: [employer] MEDICARE / Plan: [employer] MEDICARE VALUE HMO / Product Type: *No Product type* /      Patient  verified Yes     Visit #   Current / Total 6 10   Time   In / Out 10:21 am 11:17 am   Pain   In / Out 0/10 0/10   Subjective Functional Status/Changes: Patient notes following up with her MD who recommended continued PT and patient instilling a walking program, focusing on walking upright instead of bent forward.     TREATMENT AREA =  Other low back pain [M54.59]  Left hip pain [M25.552]     OBJECTIVE  Modalities Rationale:     decrease edema, decrease inflammation, and decrease pain to improve patient's ability to progress to PLOF and address remaining functional goals.    10 min [x] Estim Unattended, type/location: PMES to (L) hip and L/S                                     []  w/ice    [x]  w/heat   Skin assessment post-treatment:   Intact      Therapeutic Procedures:  Tx Min Billable or 1:1 Min (if diff from Tx Min) Procedure, Rationale, Specifics   15 8 14514 Therapeutic Exercise (timed):  increase ROM, strength, coordination, balance, and proprioception to improve patient's ability to progress to PLOF and address remaining functional goals. (see flow sheet as applicable)     Details if applicable:  stretches, strengthening, mobility      16 16 41459 Neuromuscular Re-Education (timed):  improve balance, coordination, kinesthetic sense, posture, core stability and proprioception to improve patient's ability to develop conscious control of individual muscles and awareness of position of extremities in order to progress to PLOF and address remaining functional goals. (see flow sheet as applicable)     Details if applicable:  glute/core re ed      15 15 23051 Therapeutic Activity (timed):  use of dynamic activities replicating functional movements to increase ROM,

## 2024-10-17 ENCOUNTER — HOSPITAL ENCOUNTER (OUTPATIENT)
Facility: HOSPITAL | Age: 69
Setting detail: RECURRING SERIES
Discharge: HOME | End: 2024-10-20
Payer: MEDICARE

## 2024-10-17 PROCEDURE — 97530 THERAPEUTIC ACTIVITIES: CPT

## 2024-10-17 PROCEDURE — 97110 THERAPEUTIC EXERCISES: CPT

## 2024-10-17 PROCEDURE — 97112 NEUROMUSCULAR REEDUCATION: CPT

## 2024-10-17 NOTE — PROGRESS NOTES
PHYSICAL / OCCUPATIONAL THERAPY - DAILY TREATMENT NOTE    Patient Name: Broderick Will    Date: 10/17/2024    : 1955  Insurance: Payor: [employer] MEDICARE / [facility] / Product Type: *No Product type* /      Patient  verified Yes     Visit #   Current / Total 7 10   Time   In / Out 9:00 am 9:56 am   Pain   In / Out 2/10 0/10   Subjective Functional Status/Changes: Patient notes less soreness in the left hip when standing fully upright for a long period of time.     TREATMENT AREA =  Other low back pain [M54.59]  Left hip pain [M25.552]     OBJECTIVE  Modalities Rationale:     decrease edema, decrease inflammation, and decrease pain to improve patient's ability to progress to PLOF and address remaining functional goals.    10 min [x] Estim Unattended, type/location: PMES to (L) hip and L/S                                     []  w/ice    [x]  w/heat   Skin assessment post-treatment:   Intact      Therapeutic Procedures:  Tx Min Billable or 1:1 Min (if diff from Tx Min) Procedure, Rationale, Specifics   20 12 07247 Therapeutic Exercise (timed):  increase ROM, strength, coordination, balance, and proprioception to improve patient's ability to progress to PLOF and address remaining functional goals. (see flow sheet as applicable)     Details if applicable:  stretches, strengthening, mobility      12 12 40097 Neuromuscular Re-Education (timed):  improve balance, coordination, kinesthetic sense, posture, core stability and proprioception to improve patient's ability to develop conscious control of individual muscles and awareness of position of extremities in order to progress to PLOF and address remaining functional goals. (see flow sheet as applicable)     Details if applicable:  glute/core re-education      14 14 62469 Therapeutic Activity (timed):  use of dynamic activities replicating functional movements to increase ROM, strength, coordination, balance, and proprioception in order to

## 2024-10-21 ENCOUNTER — HOSPITAL ENCOUNTER (OUTPATIENT)
Facility: HOSPITAL | Age: 69
Setting detail: RECURRING SERIES
Discharge: HOME | End: 2024-10-24
Payer: MEDICARE

## 2024-10-21 PROCEDURE — 97535 SELF CARE MNGMENT TRAINING: CPT

## 2024-10-21 PROCEDURE — 97110 THERAPEUTIC EXERCISES: CPT

## 2024-10-21 PROCEDURE — 97112 NEUROMUSCULAR REEDUCATION: CPT

## 2024-10-21 PROCEDURE — 97530 THERAPEUTIC ACTIVITIES: CPT

## 2024-10-21 NOTE — PROGRESS NOTES
PHYSICAL / OCCUPATIONAL THERAPY - DAILY TREATMENT NOTE    Patient Name: Broderick Will    Date: 10/21/2024    : 1955  Insurance: Payor: [employer] / Plan: [employer] VALUE HMO / Product Type: *No Product type* /      Patient  verified Yes     Visit #   Current / Total 8 10   Time   In / Out 9:01 AM 9:54 AM   Pain   In / Out 4/10  010    Subjective Functional Status/Changes: \"This weekend was bad, I got up to a 7/10, I was up and moving more so maybe that did it.      TREATMENT AREA =  Other low back pain [M54.59]  Left hip pain [M25.552]     OBJECTIVE    Therapeutic Procedures:  Tx Min  53 Billable or 1:1 Min (if diff from Tx Min)  53 Procedure, Rationale, Specifics   18 18 77445 Therapeutic Exercise (timed):  increase ROM, strength, coordination, balance, and proprioception to improve patient's ability to progress to PLOF and address remaining functional goals. (see flow sheet as applicable)     Details if applicable:  stretches, strengthening, mobility, nu step      11 11 70953 Neuromuscular Re-Education (timed):  improve balance, coordination, kinesthetic sense, posture, core stability and proprioception to improve patient's ability to develop conscious control of individual muscles and awareness of position of extremities in order to progress to PLOF and address remaining functional goals. (see flow sheet as applicable)     Details if applicable:  glute/core re-education      12 12 90197 Therapeutic Activity (timed):  use of dynamic activities replicating functional movements to increase ROM, strength, coordination, balance, and proprioception in order to improve patient's ability to progress to PLOF and address remaining functional goals.  (see flow sheet as applicable)     Details if applicable:  functional movements     12 12 36667 Self Care/Home Management (timed):  improve patient knowledge and understanding of pain reducing techniques, posture/ergonomics, activity modification, and

## 2024-10-22 ENCOUNTER — HOSPITAL ENCOUNTER (OUTPATIENT)
Facility: HOSPITAL | Age: 69
Setting detail: RECURRING SERIES
Discharge: HOME | End: 2024-10-25
Payer: MEDICARE

## 2024-10-22 PROCEDURE — 97110 THERAPEUTIC EXERCISES: CPT

## 2024-10-22 PROCEDURE — 97112 NEUROMUSCULAR REEDUCATION: CPT

## 2024-10-22 NOTE — PROGRESS NOTES
PHYSICAL / OCCUPATIONAL THERAPY - DAILY TREATMENT NOTE    Patient Name: Broderick Will    Date: 10/22/2024    : 1955  Insurance: Payor: [employer] / Plan: [employer] VALUE HMO / Product Type: *No Product type* /      Patient  verified Yes     Visit #   Current / Total 9 10   Time   In / Out 7:41 am 8:30 am   Pain   In / Out 3/10 2/10   Subjective Functional Status/Changes: Patient notes increased symptoms after doing yardwork recently in which she was bending down frequently and raking straw in her garden bed.     TREATMENT AREA =  Other low back pain [M54.59]  Left hip pain [M25.552]     OBJECTIVE  PD modalities.    Therapeutic Procedures:  Tx Min Billable or 1:1 Min (if diff from Tx Min) Procedure, Rationale, Specifics   35 26 83252 Therapeutic Exercise (timed):  increase ROM, strength, coordination, balance, and proprioception to improve patient's ability to progress to PLOF and address remaining functional goals. (see flow sheet as applicable)     Details if applicable:  reassessment     14  74690 Neuromuscular Re-Education (timed):  improve balance, coordination, kinesthetic sense, posture, core stability and proprioception to improve patient's ability to develop conscious control of individual muscles and awareness of position of extremities in order to progress to PLOF and address remaining functional goals. (see flow sheet as applicable)       Details if applicable:  balance            Details if applicable:            Details if applicable:     49 40 [facility] Totals Reminder: bill using total billable min of TIMED therapeutic procedures (example: do not include dry needle or estim unattended, both untimed codes, in totals to left)  8-22 min = 1 unit; 23-37 min = 2 units; 38-52 min = 3 units; 53-67 min = 4 units; 68-82 min = 5 units   Total Total     [x]  Patient Education billed concurrently with other procedures   [x] Review HEP    [] Progressed/Changed HEP, detail:    [] Other detail:

## 2024-10-22 NOTE — PROGRESS NOTES
[facility] [place]   [address] [place] [address]  Phone: [phone] Fax: [phone]  ELECTRICAL STIMULATION UNIT REQUEST  Patient Name: Broderick Will : 1955   Treatment/Medical Diagnosis: Other low back pain [M54.59]  Left hip pain [M25.552]   Referral Source:  Payor Phill Lamar MD  [facility] / Plan: [place] [employer] VALUE H[employer] / Product Type: *No Product type* /      Date of Initial Visit: 7/15/24 Attended Visits: 20 Missed Visits: 0       RECOMMENDATIONS  Patient would benefit from use of a home electrical stimulation unit to address recurrent low back and bilateral hip pain following long-duration standing/walking.    If you have any questions/comments please contact us directly.  Thank you for allowing us to assist in the care of your patient.    Kit Hancock, PTA       10/22/2024       3:28 PM  ========================================================================================    ___ I have read the above report and request that my patient continue as recommended.     Physician's Signature:_________________________   DATE:_________   TIME:________                           Phill Lamar MD    ** Signature, Date and Time must be completed for valid certification **  [facility].  Thank you

## 2024-10-22 NOTE — PROGRESS NOTES
[facility] [place]   [address] [place] [address]  Phone: [phone] Fax: [phone]  ELECTRICAL STIMULATION UNIT REQUEST  Patient Name: Broderick Will : 1955   Treatment/Medical Diagnosis: Other low back pain [M54.59]  Left hip pain [M25.552]   Referral Source:  Payor Phill Lamar MD  [facility] / Plan: [place] [employer] VALUE HMO / Product Type: *No Product type* /      Date of Initial Visit: 7/15/24 Attended Visits: 20 Missed Visits: 0       RECOMMENDATIONS  Patient would benefit from use of a home electrical stimulation unit to address recurrent low back and bilateral hip pain following long-duration standing/walking.    If you have any questions/comments please contact us directly.  Thank you for allowing us to assist in the care of your patient.    Kit Hancock, PTA       10/22/2024       3:28 PM  ========================================================================================    ___ I have read the above report and request that my patient continue as recommended.     Physician's Signature:_________________________   DATE:_________   TIME:________                           Phill Lamar MD    ** Signature, Date and Time must be completed for valid certification **  [facility].  Thank you

## 2024-10-22 NOTE — PROGRESS NOTES
[facility] [place]   [address] [place] [address]  Phone: [phone] Fax: [phone]  PROGRESS NOTE  Patient Name: Broderick Will : 1955   Treatment/Medical Diagnosis: Other low back pain [M54.59]  Left hip pain [M25.552]   Referral Source:  Payor Phill Lamar MD  Payor: [employer] / Plan: [employer] VALUE HMO / Product Type: *No Product type* /      Date of Initial Visit: 7/15/24 Attended Visits: 20 Missed Visits: 0     SUMMARY OF TREATMENT  Patient is a pleasant 68 year old female presenting to PT with complaints of low back pain and left hip pain. Treatment has consisted of the followin Therapeutic Exercise, 33639 Neuromuscular Re-Education, 42979 Therapeutic Activity, 43882 Self Care/Home Management, and 42777 Electrical Stim unattended.     CURRENT STATUS  Patient reports increased standing tolerance since start of care, noting ability to stand for longer periods at work or at [employer] with less discomfort. Objectively, patient demonstrates mild improvement in lumbar spine active range of motion and increase in LE strength, likely contributing to overall improvement in ease with ADLs. C/c regarding recurrent \"heaviness\" and \"tingling\" in (B) feet, consistent with neurogenic claudication, worsened in the the AM and with long duration walking. Patient appears to benefit from use of electrical stimulation for pain relief. Assessment as follows:    Subjective Improvements: less pain at night, increased standing tolerance such as when at work, transfers     Subjective Deficits Remaining: yardwork, squatting, repetitive twisting     Pain Pattern: Average: 3/10                        Best: 10                        Worst: /10     Gait: grossly unremarkable        Flexibility: hip flexors, hamstrings, hip external rotators (R>L)     Functional Assessment:  Squat: decreased posterior weight shift, decreased squat

## 2024-10-28 ENCOUNTER — HOSPITAL ENCOUNTER (OUTPATIENT)
Facility: HOSPITAL | Age: 69
Setting detail: RECURRING SERIES
Discharge: HOME | End: 2024-10-31
Payer: MEDICARE

## 2024-10-28 PROCEDURE — 97112 NEUROMUSCULAR REEDUCATION: CPT

## 2024-10-28 PROCEDURE — G0283 ELEC STIM OTHER THAN WOUND: HCPCS

## 2024-10-28 PROCEDURE — 97110 THERAPEUTIC EXERCISES: CPT

## 2024-10-28 PROCEDURE — 97530 THERAPEUTIC ACTIVITIES: CPT

## 2024-10-28 NOTE — PROGRESS NOTES
functional goals.  (see flow sheet as applicable)      Details if applicable:  squats, step ups, patient education      49 43 [facility] Totals Reminder: bill using total billable min of TIMED therapeutic procedures (example: do not include dry needle or estim unattended, both untimed codes, in totals to left)  8-22 min = 1 unit; 23-37 min = 2 units; 38-52 min = 3 units; 53-67 min = 4 units; 68-82 min = 5 units   Total Total     [x]  Patient Education billed concurrently with other procedures   [x] Review HEP    [] Progressed/Changed HEP, detail:  given gym specific machine suggestions per patient request  [] Other detail:       Objective Information/Functional Measures/Assessment  Appropriately challenged with all interventions, reporting reduced discomfort at end of session   Cued for avoidance of hip flexor compensation with sidelying abduction   Patient continues to respond well to use of electrical stimulation at conclusion of sessions reporting resolution of pain/ discomfort. She would benefit from the Zynex NexWave (IFC/NMES/TENS) settings to effectively and safely manage their pain, muscle spasms, and atrophy in addition to performing prescribed home exercise program.    Patient will continue to benefit from skilled PT / OT services to modify and progress therapeutic interventions, analyze and address functional mobility deficits, analyze and address ROM deficits, analyze and address strength deficits, analyze and address soft tissue restrictions, analyze and cue for proper movement patterns, analyze and modify for postural abnormalities, analyze and address imbalance/dizziness, and instruct in home and community integration to address functional deficits and attain remaining goals.    Progress toward goals / Updated goals:  []  See Progress Note/Recertification  Patient will be able to perform at least 10 sit to stands in 30 seconds (without UE assist) and proper eccentric control to indicate increased

## 2024-10-29 ENCOUNTER — HOSPITAL ENCOUNTER (OUTPATIENT)
Facility: HOSPITAL | Age: 69
Setting detail: RECURRING SERIES
Discharge: HOME | End: 2024-11-01
Payer: MEDICARE

## 2024-10-29 PROCEDURE — G0283 ELEC STIM OTHER THAN WOUND: HCPCS

## 2024-10-29 PROCEDURE — 97530 THERAPEUTIC ACTIVITIES: CPT

## 2024-10-29 PROCEDURE — 97110 THERAPEUTIC EXERCISES: CPT

## 2024-10-29 PROCEDURE — 97112 NEUROMUSCULAR REEDUCATION: CPT

## 2024-10-29 NOTE — PROGRESS NOTES
Physical Therapy Discharge Instructions      [facility]   [address] [address] [phone] fax      Patient: Broderick Will  : 1955      Continue Home Exercise Program 1 times per day:      Continue with   [x] Heat           Follow up with MD:     [x] Upon completion of therapy      Recommendations:     [x]   Return to activity with home program        Additional Comments: Broderick, thank you for your hard work during your therapy sessions and congratulations on your progress! It has been a pleasure working with you. Please reach out in the future if you have any questions.              Kit Hancock, GUADALUPE   10/29/2024   8:14 AM

## 2024-10-29 NOTE — PROGRESS NOTES
PHYSICAL / OCCUPATIONAL THERAPY - DAILY TREATMENT NOTE    Patient Name: Broderick Will    Date: 10/29/2024    : 1955  Insurance: Payor: [employer] / Plan: [employer] VALUE HMO / Product Type: *No Product type* /      Patient  verified Yes     Visit #   Current / Total 2 10   Time   In / Out 7:40 am 8:38 am   Pain   In / Out 3/10 010   Subjective Functional Status/Changes: Patient notes her pain comes and goes, worsened in the morning and better as that day progresses.     TREATMENT AREA =  Other low back pain [M54.59]  Left hip pain [M25.552]     OBJECTIVE    Estim Unattended (UNTIMED), with HEAT type/location: in sidelying; IFC to left hip     Min Rationale   10 decrease pain and increase tissue extensibility to improve patient's ability to progress to PLOF and address remaining functional goals.     Skin assessment post-treatment:   Intact        Therapeutic Procedures:  Tx Min   Billable or 1:1 Min (if diff from Tx Min)   Procedure, Rationale, Specifics   29 21 95696 Therapeutic Exercise (timed):  increase ROM, strength, coordination, balance, and proprioception to improve patient's ability to progress to PLOF and address remaining functional goals. (see flow sheet as applicable)     Details if applicable: reassessment     8 8 67115 Neuromuscular Re-Education (timed):  improve balance, coordination, kinesthetic sense, posture, core stability and proprioception to improve patient's ability to develop conscious control of individual muscles and awareness of position of extremities in order to progress to PLOF and address remaining functional goals. (see flow sheet as applicable)     Details if applicable:  glute/quad re ed      11   23540 Therapeutic Activity (timed):  use of dynamic activities replicating functional movements to increase ROM, strength, coordination, balance, and proprioception in order to improve patient's ability to progress to PLOF and address remaining functional goals.

## 2025-06-06 NOTE — PROGRESS NOTES
radiologic evidence of lateral recess stenosis potentially effecting the descending S1 nerve roots. Her radicular symptoms may derive from piriformis syndrome or lumbar radiculopathy or early signs of neuropathy.     No results found for: \"CREATININE\", \"LABA1C\"   PLAN  Continue Mobic and Tylenol PRN through PCP.  Discussed importance of maintaining consistent physical activity and mobility for muscle strengthening and conditioning.     Follow-up and Dispositions    Return if symptoms worsen or fail to improve.       Broderick was seen today for lower back pain.    Diagnoses and all orders for this visit:    Chronic primary musculoskeletal pain    Lumbar pain    Lumbar radiculopathy    Bilateral hip pain         PAST MEDICAL HISTORY   Past Medical History:   Diagnosis Date    Back pain        Past Surgical History:   Procedure Laterality Date    BREAST LUMPECTOMY Left     pt states that this was done years ago- not able to remember       MEDICATIONS      Current Outpatient Medications   Medication Sig Dispense Refill    acetaminophen (TYLENOL) 500 MG tablet Take 1 tablet by mouth every 6 hours as needed for Pain      meloxicam (MOBIC) 15 MG tablet take 1 tablet by mouth once daily DISCONTINUE MELOXICAM 7.5 MILLIGRAM 2 TIMES A DAY      vitamin D (ERGOCALCIFEROL) 1.25 MG (03764 UT) CAPS capsule Take 1 capsule by mouth Once a week at 5 PM      calcium carbonate (GNP CALCIUM) 1500 (600 Ca) MG TABS tablet Take 1 tablet by mouth daily      meloxicam (MOBIC) 7.5 MG tablet Take 1 tablet by mouth in the morning and at bedtime (Patient not taking: Reported on 8/20/2024)      gabapentin (NEURONTIN) 100 MG capsule Take 2 capsules by mouth nightly. (Patient not taking: Reported on 10/15/2024)      gabapentin (NEURONTIN) 300 MG capsule Take 1 capsule by mouth 3 times daily. Pt is not taking this yet, she is still on the 200 mg and is titrating up if she can tolerate it Max Daily Amount: 900 mg (Patient not taking: Reported on

## 2025-06-10 ENCOUNTER — OFFICE VISIT (OUTPATIENT)
Age: 70
End: 2025-06-10
Payer: MEDICARE

## 2025-06-10 VITALS
RESPIRATION RATE: 16 BRPM | HEART RATE: 76 BPM | SYSTOLIC BLOOD PRESSURE: 135 MMHG | WEIGHT: 197.8 LBS | HEIGHT: 71 IN | BODY MASS INDEX: 27.69 KG/M2 | DIASTOLIC BLOOD PRESSURE: 81 MMHG

## 2025-06-10 DIAGNOSIS — M54.50 LUMBAR PAIN: ICD-10-CM

## 2025-06-10 DIAGNOSIS — M79.18 CHRONIC PRIMARY MUSCULOSKELETAL PAIN: Primary | ICD-10-CM

## 2025-06-10 DIAGNOSIS — M25.551 BILATERAL HIP PAIN: ICD-10-CM

## 2025-06-10 DIAGNOSIS — M25.552 BILATERAL HIP PAIN: ICD-10-CM

## 2025-06-10 DIAGNOSIS — M54.16 LUMBAR RADICULOPATHY: ICD-10-CM

## 2025-06-10 DIAGNOSIS — G89.29 CHRONIC PRIMARY MUSCULOSKELETAL PAIN: Primary | ICD-10-CM

## 2025-06-10 PROCEDURE — 1123F ACP DISCUSS/DSCN MKR DOCD: CPT | Performed by: PHYSICAL MEDICINE & REHABILITATION

## 2025-06-10 PROCEDURE — 1159F MED LIST DOCD IN RCRD: CPT | Performed by: PHYSICAL MEDICINE & REHABILITATION

## 2025-06-10 PROCEDURE — 99213 OFFICE O/P EST LOW 20 MIN: CPT | Performed by: PHYSICAL MEDICINE & REHABILITATION

## 2025-06-10 PROCEDURE — 1125F AMNT PAIN NOTED PAIN PRSNT: CPT | Performed by: PHYSICAL MEDICINE & REHABILITATION

## 2025-06-10 PROCEDURE — 1160F RVW MEDS BY RX/DR IN RCRD: CPT | Performed by: PHYSICAL MEDICINE & REHABILITATION

## 2025-06-10 ASSESSMENT — ENCOUNTER SYMPTOMS
TROUBLE SWALLOWING: 0
SHORTNESS OF BREATH: 0
BACK PAIN: 1
NAUSEA: 0
VOMITING: 0
WHEEZING: 0